# Patient Record
Sex: FEMALE | Race: WHITE | NOT HISPANIC OR LATINO | Employment: OTHER | URBAN - METROPOLITAN AREA
[De-identification: names, ages, dates, MRNs, and addresses within clinical notes are randomized per-mention and may not be internally consistent; named-entity substitution may affect disease eponyms.]

---

## 2023-09-07 LAB
ALANINE AMINOTRANSFERASE (SGPT) (U/L) IN SER/PLAS: 11 U/L (ref 7–45)
ALBUMIN (G/DL) IN SER/PLAS: 4.1 G/DL (ref 3.4–5)
ALKALINE PHOSPHATASE (U/L) IN SER/PLAS: 64 U/L (ref 33–136)
ANION GAP IN SER/PLAS: 13 MMOL/L (ref 10–20)
APPEARANCE, URINE: CLEAR
ASPARTATE AMINOTRANSFERASE (SGOT) (U/L) IN SER/PLAS: 20 U/L (ref 9–39)
BILIRUBIN TOTAL (MG/DL) IN SER/PLAS: 0.6 MG/DL (ref 0–1.2)
BILIRUBIN, URINE: NEGATIVE
BLOOD, URINE: ABNORMAL
CALCIDIOL (25 OH VITAMIN D3) (NG/ML) IN SER/PLAS: 87 NG/ML
CALCIUM (MG/DL) IN SER/PLAS: 9.5 MG/DL (ref 8.6–10.3)
CARBON DIOXIDE, TOTAL (MMOL/L) IN SER/PLAS: 28 MMOL/L (ref 21–32)
CHLORIDE (MMOL/L) IN SER/PLAS: 104 MMOL/L (ref 98–107)
CHOLESTEROL (MG/DL) IN SER/PLAS: 228 MG/DL (ref 0–199)
CHOLESTEROL IN HDL (MG/DL) IN SER/PLAS: 57 MG/DL
CHOLESTEROL/HDL RATIO: 4
COLOR, URINE: YELLOW
CREATININE (MG/DL) IN SER/PLAS: 1.5 MG/DL (ref 0.5–1.05)
ERYTHROCYTE DISTRIBUTION WIDTH (RATIO) BY AUTOMATED COUNT: 14.9 % (ref 11.5–14.5)
ERYTHROCYTE MEAN CORPUSCULAR HEMOGLOBIN CONCENTRATION (G/DL) BY AUTOMATED: 32.4 G/DL (ref 32–36)
ERYTHROCYTE MEAN CORPUSCULAR VOLUME (FL) BY AUTOMATED COUNT: 100 FL (ref 80–100)
ERYTHROCYTES (10*6/UL) IN BLOOD BY AUTOMATED COUNT: 4.18 X10E12/L (ref 4–5.2)
GFR FEMALE: 33 ML/MIN/1.73M2
GLUCOSE (MG/DL) IN SER/PLAS: 106 MG/DL (ref 74–99)
GLUCOSE, URINE: NEGATIVE MG/DL
HEMATOCRIT (%) IN BLOOD BY AUTOMATED COUNT: 41.7 % (ref 36–46)
HEMOGLOBIN (G/DL) IN BLOOD: 13.5 G/DL (ref 12–16)
KETONES, URINE: NEGATIVE MG/DL
LDL: 155 MG/DL (ref 0–99)
LEUKOCYTE ESTERASE, URINE: ABNORMAL
LEUKOCYTES (10*3/UL) IN BLOOD BY AUTOMATED COUNT: 7.2 X10E9/L (ref 4.4–11.3)
NITRITE, URINE: NEGATIVE
PH, URINE: 6 (ref 5–8)
PLATELETS (10*3/UL) IN BLOOD AUTOMATED COUNT: 218 X10E9/L (ref 150–450)
POTASSIUM (MMOL/L) IN SER/PLAS: 3.9 MMOL/L (ref 3.5–5.3)
PROTEIN TOTAL: 7.1 G/DL (ref 6.4–8.2)
PROTEIN, URINE: NEGATIVE MG/DL
RBC, URINE: 1 /HPF (ref 0–5)
SODIUM (MMOL/L) IN SER/PLAS: 141 MMOL/L (ref 136–145)
SPECIFIC GRAVITY, URINE: 1.01 (ref 1–1.03)
SQUAMOUS EPITHELIAL CELLS, URINE: <1 /HPF
THYROTROPIN (MIU/L) IN SER/PLAS BY DETECTION LIMIT <= 0.05 MIU/L: 4.3 MIU/L (ref 0.44–3.98)
TRIGLYCERIDE (MG/DL) IN SER/PLAS: 81 MG/DL (ref 0–149)
UREA NITROGEN (MG/DL) IN SER/PLAS: 25 MG/DL (ref 6–23)
UROBILINOGEN, URINE: <2 MG/DL (ref 0–1.9)
VLDL: 16 MG/DL (ref 0–40)
WBC, URINE: 2 /HPF (ref 0–5)

## 2024-01-05 PROBLEM — M81.0 OSTEOPOROSIS, SENILE: Status: ACTIVE | Noted: 2024-01-05

## 2024-01-05 PROBLEM — I42.8 NON-ISCHEMIC CARDIOMYOPATHY (MULTI): Status: ACTIVE | Noted: 2024-01-05

## 2024-01-05 PROBLEM — I10 WHITE COAT SYNDROME WITH HYPERTENSION: Status: ACTIVE | Noted: 2024-01-05

## 2024-01-05 PROBLEM — I44.7 LEFT BUNDLE BRANCH BLOCK: Status: ACTIVE | Noted: 2024-01-05

## 2024-01-05 PROBLEM — J38.3 SPASTIC DYSPHONIA: Status: ACTIVE | Noted: 2024-01-05

## 2024-01-05 PROBLEM — E78.2 MIXED DYSLIPIDEMIA: Status: ACTIVE | Noted: 2024-01-05

## 2024-01-05 PROBLEM — I07.1 TRICUSPID VALVE REGURGITATION: Status: ACTIVE | Noted: 2024-01-05

## 2024-01-05 PROBLEM — T78.3XXA ANGIOEDEMA: Status: ACTIVE | Noted: 2024-01-05

## 2024-01-05 PROBLEM — E55.9 VITAMIN D DEFICIENCY: Status: ACTIVE | Noted: 2024-01-05

## 2024-01-05 PROBLEM — I26.99 PULMONARY EMBOLISM (MULTI): Status: ACTIVE | Noted: 2024-01-05

## 2024-01-05 PROBLEM — I10 ESSENTIAL HYPERTENSION, BENIGN: Status: ACTIVE | Noted: 2024-01-05

## 2024-01-05 PROBLEM — E66.3 OVERWEIGHT: Status: ACTIVE | Noted: 2024-01-05

## 2024-01-05 PROBLEM — I24.0: Status: ACTIVE | Noted: 2024-01-05

## 2024-01-05 PROBLEM — I27.21 PULMONARY ARTERY HYPERTENSION (MULTI): Status: ACTIVE | Noted: 2024-01-05

## 2024-01-05 RX ORDER — CARVEDILOL 25 MG/1
1.5 TABLET ORAL
COMMUNITY
End: 2024-01-31 | Stop reason: SDUPTHER

## 2024-01-05 RX ORDER — ACETAMINOPHEN 500 MG
1 TABLET ORAL DAILY
COMMUNITY

## 2024-01-05 RX ORDER — FUROSEMIDE 20 MG/1
1 TABLET ORAL DAILY
COMMUNITY
End: 2024-02-13

## 2024-01-05 RX ORDER — MULTIVITAMIN WITH MINERALS
1 TABLET ORAL DAILY
COMMUNITY

## 2024-01-05 RX ORDER — ASPIRIN 81 MG/1
1 TABLET ORAL DAILY
COMMUNITY

## 2024-01-31 DIAGNOSIS — I10 ESSENTIAL HYPERTENSION, BENIGN: Primary | ICD-10-CM

## 2024-01-31 RX ORDER — CARVEDILOL 25 MG/1
37.5 TABLET ORAL
Qty: 180 TABLET | Refills: 1 | Status: SHIPPED | OUTPATIENT
Start: 2024-01-31

## 2024-02-13 DIAGNOSIS — I10 ESSENTIAL (PRIMARY) HYPERTENSION: ICD-10-CM

## 2024-02-13 RX ORDER — FUROSEMIDE 20 MG/1
20 TABLET ORAL DAILY
Qty: 90 TABLET | Refills: 3 | Status: SHIPPED | OUTPATIENT
Start: 2024-02-13

## 2024-03-18 ENCOUNTER — OFFICE VISIT (OUTPATIENT)
Dept: PRIMARY CARE | Facility: CLINIC | Age: 89
End: 2024-03-18
Payer: MEDICARE

## 2024-03-18 VITALS
DIASTOLIC BLOOD PRESSURE: 100 MMHG | TEMPERATURE: 97.9 F | HEART RATE: 80 BPM | HEIGHT: 60 IN | WEIGHT: 150 LBS | OXYGEN SATURATION: 96 % | SYSTOLIC BLOOD PRESSURE: 160 MMHG | BODY MASS INDEX: 29.45 KG/M2

## 2024-03-18 DIAGNOSIS — R39.9 URINARY TRACT INFECTION SYMPTOMS: ICD-10-CM

## 2024-03-18 DIAGNOSIS — I27.21 PULMONARY ARTERY HYPERTENSION (MULTI): ICD-10-CM

## 2024-03-18 DIAGNOSIS — E78.2 MIXED DYSLIPIDEMIA: ICD-10-CM

## 2024-03-18 DIAGNOSIS — I26.99 PULMONARY EMBOLISM, OTHER, UNSPECIFIED CHRONICITY, UNSPECIFIED WHETHER ACUTE COR PULMONALE PRESENT (MULTI): ICD-10-CM

## 2024-03-18 DIAGNOSIS — E55.9 VITAMIN D INSUFFICIENCY: ICD-10-CM

## 2024-03-18 DIAGNOSIS — I10 ESSENTIAL (PRIMARY) HYPERTENSION: Primary | ICD-10-CM

## 2024-03-18 DIAGNOSIS — I42.8 NON-ISCHEMIC CARDIOMYOPATHY (MULTI): ICD-10-CM

## 2024-03-18 DIAGNOSIS — Z00.00 ENCOUNTER FOR WELLNESS EXAMINATION: ICD-10-CM

## 2024-03-18 PROCEDURE — 1159F MED LIST DOCD IN RCRD: CPT | Performed by: INTERNAL MEDICINE

## 2024-03-18 PROCEDURE — 99214 OFFICE O/P EST MOD 30 MIN: CPT | Performed by: INTERNAL MEDICINE

## 2024-03-18 PROCEDURE — 1036F TOBACCO NON-USER: CPT | Performed by: INTERNAL MEDICINE

## 2024-03-18 PROCEDURE — 3077F SYST BP >= 140 MM HG: CPT | Performed by: INTERNAL MEDICINE

## 2024-03-18 PROCEDURE — 3080F DIAST BP >= 90 MM HG: CPT | Performed by: INTERNAL MEDICINE

## 2024-03-18 ASSESSMENT — PATIENT HEALTH QUESTIONNAIRE - PHQ9
SUM OF ALL RESPONSES TO PHQ9 QUESTIONS 1 AND 2: 0
2. FEELING DOWN, DEPRESSED OR HOPELESS: NOT AT ALL
1. LITTLE INTEREST OR PLEASURE IN DOING THINGS: NOT AT ALL

## 2024-03-18 ASSESSMENT — ENCOUNTER SYMPTOMS
JOINT SWELLING: 0
STRIDOR: 0
LOSS OF SENSATION IN FEET: 0
OCCASIONAL FEELINGS OF UNSTEADINESS: 1
HEMATURIA: 0
DEPRESSION: 0
BLOOD IN STOOL: 0
LIGHT-HEADEDNESS: 0
SPEECH DIFFICULTY: 0
ACTIVITY CHANGE: 0
PALPITATIONS: 0
EYE REDNESS: 0
FEVER: 0
CHEST TIGHTNESS: 0

## 2024-03-18 NOTE — PROGRESS NOTES
CHIEF COMPLAINT:    Mendy Diaz is a 89 y.o. female who presents for Follow-up (Patient here for follow-up appointment. ).    HISTORY OF PRESENT ILLNESS:  Mendy Diaz  is a pleasant 51-year-old female comes with her daughter here for her routine checkup.  Overall she is doing well.  She does have hypertension.  She is on Lasix.  She is complaining of muscular aches and pains.  She also tries to take care of potassium through her food.  Patient does have a strong whitecoat hypertension.  Her blood pressure here is high however her home blood pressure recording is within the normal limit.  This is something that I am aware of about her blood pressure and we did not change the blood pressure medications.  She gets very dizzy and lightheaded with the change of her blood pressure medications.  Patient does not have any acute medical complaint today.        Review of Systems   Constitutional:  Negative for activity change and fever.   HENT:  Negative for hearing loss, nosebleeds and tinnitus.    Eyes:  Negative for redness.   Respiratory:  Negative for chest tightness and stridor.    Cardiovascular:  Negative for chest pain, palpitations and leg swelling.   Gastrointestinal:  Negative for blood in stool.   Endocrine: Negative for cold intolerance.   Genitourinary:  Negative for hematuria.   Musculoskeletal:  Negative for joint swelling.   Skin:  Negative for rash.   Neurological:  Negative for speech difficulty and light-headedness.   Psychiatric/Behavioral:  Negative for behavioral problems.      Visit Vitals  BP (!) 160/100 (BP Location: Left arm, Patient Position: Sitting, BP Cuff Size: Adult)   Pulse 80   Temp 36.6 °C (97.9 °F) (Temporal)   Ht 1.524 m (5')   Wt 68 kg (150 lb)   SpO2 96%   BMI 29.29 kg/m²   Smoking Status Never   BSA 1.7 m²         Wt Readings from Last 10 Encounters:   03/18/24 68 kg (150 lb)   09/28/23 68 kg (150 lb)   07/25/23 68 kg (150 lb)   09/28/22 70.5 kg (155 lb 8 oz)   07/28/22 69.9 kg  (154 lb)       Physical Exam  Constitutional:       General: She is not in acute distress.     Appearance: Normal appearance.   HENT:      Head: Normocephalic.      Right Ear: Tympanic membrane normal.      Left Ear: Tympanic membrane normal.      Mouth/Throat:      Mouth: Mucous membranes are moist.   Cardiovascular:      Rate and Rhythm: Normal rate and regular rhythm.      Heart sounds: No murmur heard.  Pulmonary:      Effort: No respiratory distress.   Abdominal:      Palpations: Abdomen is soft.   Musculoskeletal:      Cervical back: Neck supple.      Right lower leg: No edema.      Left lower leg: No edema.   Skin:     Findings: No rash.   Neurological:      General: No focal deficit present.      Mental Status: She is alert and oriented to person, place, and time.   Psychiatric:         Mood and Affect: Mood normal.        RECENT LABS:  Lab Results   Component Value Date    WBC 7.2 09/07/2023    HGB 13.5 09/07/2023    HCT 41.7 09/07/2023     09/07/2023    CHOL 228 (H) 09/07/2023    TRIG 81 09/07/2023    HDL 57.0 09/07/2023    ALT 11 09/07/2023    AST 20 09/07/2023     09/07/2023    K 3.9 09/07/2023     09/07/2023    CREATININE 1.50 (H) 09/07/2023    BUN 25 (H) 09/07/2023    CO2 28 09/07/2023    TSH 4.30 (H) 09/07/2023     IMAGING:        Reviewed:   MEDICATIONS:   Current Outpatient Medications   Medication Instructions    aspirin 81 mg EC tablet 1 tablet, oral, Daily    carvedilol (COREG) 37.5 mg, oral, 2 times daily with meals    cholecalciferol (Vitamin D-3) 50 mcg (2,000 unit) capsule 1 capsule, oral, Daily    furosemide (LASIX) 20 mg, oral, Daily    multivitamin with minerals (Multiple Vitamin-Minerals) tablet 1 tablet, oral, Daily      TODAY'S VISIT  DX:   1. Essential (primary) hypertension  CBC and Auto Differential      2. Pulmonary embolism, other, unspecified chronicity, unspecified whether acute cor pulmonale present (CMS/HCC)        3. Pulmonary artery hypertension (CMS/HCC)         4. Non-ischemic cardiomyopathy (CMS/HCC)        5. Encounter for wellness examination  Comprehensive Metabolic Panel    Lipid Panel    TSH with reflex to Free T4 if abnormal    Urinalysis with Reflex Microscopic      6. Mixed dyslipidemia  Lipid Panel      7. Urinary tract infection symptoms  Urinalysis with Reflex Microscopic      8. Vitamin D insufficiency  Vitamin D 25-Hydroxy,Total (for eval of Vitamin D levels)         MEDICAL DECISION MAKING:  - Recent lab work and relevant imaging studies have been reviewed.    - The current active medical co morbidities have been considered.   - Relevant correspondence/notes from specialty consultants were reviewed and discussed with patient.    - Patient was given treatment as per above plan.   - Patient will continue with current medical therapy and plan.   - Medication have been sent for refill.    - Next Follow up in July 2024..

## 2024-07-01 ENCOUNTER — LAB (OUTPATIENT)
Dept: LAB | Facility: LAB | Age: 89
End: 2024-07-01
Payer: MEDICARE

## 2024-07-01 DIAGNOSIS — R39.9 URINARY TRACT INFECTION SYMPTOMS: ICD-10-CM

## 2024-07-01 DIAGNOSIS — E78.2 MIXED DYSLIPIDEMIA: ICD-10-CM

## 2024-07-01 DIAGNOSIS — I10 ESSENTIAL (PRIMARY) HYPERTENSION: ICD-10-CM

## 2024-07-01 DIAGNOSIS — E55.9 VITAMIN D INSUFFICIENCY: ICD-10-CM

## 2024-07-01 DIAGNOSIS — Z00.00 ENCOUNTER FOR WELLNESS EXAMINATION: ICD-10-CM

## 2024-07-01 LAB
25(OH)D3 SERPL-MCNC: 85 NG/ML (ref 30–100)
ALBUMIN SERPL BCP-MCNC: 4 G/DL (ref 3.4–5)
ALP SERPL-CCNC: 52 U/L (ref 33–136)
ALT SERPL W P-5'-P-CCNC: 8 U/L (ref 7–45)
ANION GAP SERPL CALC-SCNC: 14 MMOL/L (ref 10–20)
APPEARANCE UR: CLEAR
AST SERPL W P-5'-P-CCNC: 14 U/L (ref 9–39)
BASOPHILS # BLD AUTO: 0.06 X10*3/UL (ref 0–0.1)
BASOPHILS NFR BLD AUTO: 1.1 %
BILIRUB SERPL-MCNC: 0.6 MG/DL (ref 0–1.2)
BILIRUB UR STRIP.AUTO-MCNC: NEGATIVE MG/DL
BUN SERPL-MCNC: 29 MG/DL (ref 6–23)
CALCIUM SERPL-MCNC: 9.4 MG/DL (ref 8.6–10.3)
CHLORIDE SERPL-SCNC: 104 MMOL/L (ref 98–107)
CHOLEST SERPL-MCNC: 258 MG/DL (ref 0–199)
CHOLESTEROL/HDL RATIO: 5.2
CO2 SERPL-SCNC: 28 MMOL/L (ref 21–32)
COLOR UR: ABNORMAL
CREAT SERPL-MCNC: 1.36 MG/DL (ref 0.5–1.05)
EGFRCR SERPLBLD CKD-EPI 2021: 37 ML/MIN/1.73M*2
EOSINOPHIL # BLD AUTO: 0.27 X10*3/UL (ref 0–0.4)
EOSINOPHIL NFR BLD AUTO: 5.1 %
ERYTHROCYTE [DISTWIDTH] IN BLOOD BY AUTOMATED COUNT: 15.2 % (ref 11.5–14.5)
GLUCOSE SERPL-MCNC: 95 MG/DL (ref 74–99)
GLUCOSE UR STRIP.AUTO-MCNC: NORMAL MG/DL
HCT VFR BLD AUTO: 39.6 % (ref 36–46)
HDLC SERPL-MCNC: 49.3 MG/DL
HGB BLD-MCNC: 12.8 G/DL (ref 12–16)
HYALINE CASTS #/AREA URNS AUTO: ABNORMAL /LPF
IMM GRANULOCYTES # BLD AUTO: 0.04 X10*3/UL (ref 0–0.5)
IMM GRANULOCYTES NFR BLD AUTO: 0.8 % (ref 0–0.9)
KETONES UR STRIP.AUTO-MCNC: NEGATIVE MG/DL
LDLC SERPL CALC-MCNC: 193 MG/DL
LEUKOCYTE ESTERASE UR QL STRIP.AUTO: ABNORMAL
LYMPHOCYTES # BLD AUTO: 1.59 X10*3/UL (ref 0.8–3)
LYMPHOCYTES NFR BLD AUTO: 30.1 %
MCH RBC QN AUTO: 31.8 PG (ref 26–34)
MCHC RBC AUTO-ENTMCNC: 32.3 G/DL (ref 32–36)
MCV RBC AUTO: 98 FL (ref 80–100)
MONOCYTES # BLD AUTO: 0.56 X10*3/UL (ref 0.05–0.8)
MONOCYTES NFR BLD AUTO: 10.6 %
NEUTROPHILS # BLD AUTO: 2.76 X10*3/UL (ref 1.6–5.5)
NEUTROPHILS NFR BLD AUTO: 52.3 %
NITRITE UR QL STRIP.AUTO: NEGATIVE
NON HDL CHOLESTEROL: 209 MG/DL (ref 0–149)
NRBC BLD-RTO: 0 /100 WBCS (ref 0–0)
PH UR STRIP.AUTO: 5.5 [PH]
PLATELET # BLD AUTO: 192 X10*3/UL (ref 150–450)
POTASSIUM SERPL-SCNC: 4.7 MMOL/L (ref 3.5–5.3)
PROT SERPL-MCNC: 6.5 G/DL (ref 6.4–8.2)
PROT UR STRIP.AUTO-MCNC: NEGATIVE MG/DL
RBC # BLD AUTO: 4.03 X10*6/UL (ref 4–5.2)
RBC # UR STRIP.AUTO: NEGATIVE /UL
RBC #/AREA URNS AUTO: ABNORMAL /HPF
SODIUM SERPL-SCNC: 141 MMOL/L (ref 136–145)
SP GR UR STRIP.AUTO: 1.01
TRIGL SERPL-MCNC: 80 MG/DL (ref 0–149)
TSH SERPL-ACNC: 3.61 MIU/L (ref 0.44–3.98)
UROBILINOGEN UR STRIP.AUTO-MCNC: NORMAL MG/DL
VLDL: 16 MG/DL (ref 0–40)
WBC # BLD AUTO: 5.3 X10*3/UL (ref 4.4–11.3)
WBC #/AREA URNS AUTO: ABNORMAL /HPF
WBC CLUMPS #/AREA URNS AUTO: ABNORMAL /HPF

## 2024-07-01 PROCEDURE — 36415 COLL VENOUS BLD VENIPUNCTURE: CPT

## 2024-07-01 PROCEDURE — 80053 COMPREHEN METABOLIC PANEL: CPT

## 2024-07-01 PROCEDURE — 82306 VITAMIN D 25 HYDROXY: CPT

## 2024-07-01 PROCEDURE — 81001 URINALYSIS AUTO W/SCOPE: CPT

## 2024-07-01 PROCEDURE — 84443 ASSAY THYROID STIM HORMONE: CPT

## 2024-07-01 PROCEDURE — 85025 COMPLETE CBC W/AUTO DIFF WBC: CPT

## 2024-07-01 PROCEDURE — 80061 LIPID PANEL: CPT

## 2024-07-05 DIAGNOSIS — N30.00 ACUTE CYSTITIS WITHOUT HEMATURIA: Primary | ICD-10-CM

## 2024-07-05 RX ORDER — NITROFURANTOIN 25; 75 MG/1; MG/1
100 CAPSULE ORAL 2 TIMES DAILY
Qty: 14 CAPSULE | Refills: 0 | Status: SHIPPED | OUTPATIENT
Start: 2024-07-05 | End: 2024-07-12

## 2024-07-15 ENCOUNTER — APPOINTMENT (OUTPATIENT)
Dept: PRIMARY CARE | Facility: CLINIC | Age: 89
End: 2024-07-15
Payer: MEDICARE

## 2024-07-15 VITALS
BODY MASS INDEX: 28.51 KG/M2 | WEIGHT: 146 LBS | TEMPERATURE: 98.2 F | DIASTOLIC BLOOD PRESSURE: 88 MMHG | OXYGEN SATURATION: 95 % | HEART RATE: 74 BPM | SYSTOLIC BLOOD PRESSURE: 148 MMHG

## 2024-07-15 DIAGNOSIS — Z78.0 ENCOUNTER FOR OSTEOPOROSIS SCREENING IN ASYMPTOMATIC POSTMENOPAUSAL PATIENT: Primary | ICD-10-CM

## 2024-07-15 DIAGNOSIS — Z13.820 ENCOUNTER FOR OSTEOPOROSIS SCREENING IN ASYMPTOMATIC POSTMENOPAUSAL PATIENT: Primary | ICD-10-CM

## 2024-07-15 DIAGNOSIS — I10 ESSENTIAL HYPERTENSION, BENIGN: ICD-10-CM

## 2024-07-15 DIAGNOSIS — N18.31 STAGE 3A CHRONIC KIDNEY DISEASE (MULTI): ICD-10-CM

## 2024-07-15 PROCEDURE — 1124F ACP DISCUSS-NO DSCNMKR DOCD: CPT | Performed by: INTERNAL MEDICINE

## 2024-07-15 PROCEDURE — 1159F MED LIST DOCD IN RCRD: CPT | Performed by: INTERNAL MEDICINE

## 2024-07-15 PROCEDURE — 3077F SYST BP >= 140 MM HG: CPT | Performed by: INTERNAL MEDICINE

## 2024-07-15 PROCEDURE — 99214 OFFICE O/P EST MOD 30 MIN: CPT | Performed by: INTERNAL MEDICINE

## 2024-07-15 PROCEDURE — 3079F DIAST BP 80-89 MM HG: CPT | Performed by: INTERNAL MEDICINE

## 2024-07-15 ASSESSMENT — ENCOUNTER SYMPTOMS: DEPRESSION: 0

## 2024-07-15 ASSESSMENT — PATIENT HEALTH QUESTIONNAIRE - PHQ9
1. LITTLE INTEREST OR PLEASURE IN DOING THINGS: NOT AT ALL
2. FEELING DOWN, DEPRESSED OR HOPELESS: NOT AT ALL
SUM OF ALL RESPONSES TO PHQ9 QUESTIONS 1 AND 2: 0

## 2024-07-29 DIAGNOSIS — I10 ESSENTIAL HYPERTENSION, BENIGN: ICD-10-CM

## 2024-07-30 RX ORDER — CARVEDILOL 25 MG/1
37.5 TABLET ORAL
Qty: 180 TABLET | Refills: 1 | Status: SHIPPED | OUTPATIENT
Start: 2024-07-30

## 2024-07-31 PROBLEM — M81.0 OSTEOPOROSIS, SENILE: Status: RESOLVED | Noted: 2024-01-05 | Resolved: 2024-07-31

## 2024-07-31 ASSESSMENT — ENCOUNTER SYMPTOMS
JOINT SWELLING: 0
HEMATURIA: 0
EYE REDNESS: 0
LIGHT-HEADEDNESS: 0
STRIDOR: 0
SPEECH DIFFICULTY: 0
PALPITATIONS: 0
ACTIVITY CHANGE: 0
BLOOD IN STOOL: 0
CHEST TIGHTNESS: 0
FEVER: 0

## 2024-08-01 NOTE — PROGRESS NOTES
CHIEF COMPLAINT:    Chief Complaint   Patient presents with    Follow-up     4 month        HISTORY OF PRESENT ILLNESS:  Mendy Diaz  is a pleasant 89 y.o. female Came with her daughter.  This is her follow-up visit.  Overall she is doing well.  She does have hypertension.  Her office her blood pressure today again is high.  She definitely has whitecoat hypertension.  We had multiple times given her blood pressure log.  Her home blood pressures are always normal.  Patient has done her annual blood work.  Her cancer screening tests are up-to-date.  She will require a DEXA scan.  She has chronic kidney disease which is stable.  She sees Dr. Paul for her cardiac health.      Review of Systems   Constitutional:  Negative for activity change and fever.   HENT:  Negative for hearing loss, nosebleeds and tinnitus.    Eyes:  Negative for redness.   Respiratory:  Negative for chest tightness and stridor.    Cardiovascular:  Negative for chest pain, palpitations and leg swelling.   Gastrointestinal:  Negative for blood in stool.   Endocrine: Negative for cold intolerance.   Genitourinary:  Negative for hematuria.   Musculoskeletal:  Negative for joint swelling.   Skin:  Negative for rash.   Neurological:  Negative for speech difficulty and light-headedness.   Psychiatric/Behavioral:  Negative for behavioral problems.      Visit Vitals  /88 (BP Location: Left arm, Patient Position: Sitting, BP Cuff Size: Adult)   Pulse 74   Temp 36.8 °C (98.2 °F) (Tympanic)   Wt 66.2 kg (146 lb)   SpO2 95%   BMI 28.51 kg/m²   Smoking Status Never   BSA 1.67 m²         Wt Readings from Last 10 Encounters:   07/15/24 66.2 kg (146 lb)   03/18/24 68 kg (150 lb)   09/28/23 68 kg (150 lb)   07/25/23 68 kg (150 lb)   09/28/22 70.5 kg (155 lb 8 oz)   07/28/22 69.9 kg (154 lb)       Physical Exam  Constitutional:       General: She is not in acute distress.     Appearance: Normal appearance.   HENT:      Head: Normocephalic.      Right Ear:  Tympanic membrane normal.      Left Ear: Tympanic membrane normal.      Mouth/Throat:      Mouth: Mucous membranes are moist.   Cardiovascular:      Rate and Rhythm: Normal rate and regular rhythm.      Heart sounds: No murmur heard.  Pulmonary:      Effort: No respiratory distress.   Abdominal:      Palpations: Abdomen is soft.   Musculoskeletal:      Cervical back: Neck supple.      Right lower leg: No edema.      Left lower leg: No edema.   Skin:     Findings: No rash.   Neurological:      General: No focal deficit present.      Mental Status: She is alert and oriented to person, place, and time.   Psychiatric:         Mood and Affect: Mood normal.        RECENT LABS:  Lab Results   Component Value Date    WBC 5.3 07/01/2024    HGB 12.8 07/01/2024    HCT 39.6 07/01/2024     07/01/2024    CHOL 258 (H) 07/01/2024    TRIG 80 07/01/2024    HDL 49.3 07/01/2024    ALT 8 07/01/2024    AST 14 07/01/2024     07/01/2024    K 4.7 07/01/2024     07/01/2024    CREATININE 1.36 (H) 07/01/2024    BUN 29 (H) 07/01/2024    CO2 28 07/01/2024    TSH 3.61 07/01/2024     Lab Results   Component Value Date    GLUCOSE 95 07/01/2024    CALCIUM 9.4 07/01/2024     07/01/2024    K 4.7 07/01/2024    CO2 28 07/01/2024     07/01/2024    BUN 29 (H) 07/01/2024    CREATININE 1.36 (H) 07/01/2024        MEDICATIONS:   Current Outpatient Medications   Medication Instructions    aspirin 81 mg EC tablet 1 tablet, oral, Daily    carvedilol (COREG) 37.5 mg, oral, 2 times daily (morning and late afternoon)    cholecalciferol (Vitamin D-3) 50 mcg (2,000 unit) capsule 1 capsule, oral, Daily    furosemide (LASIX) 20 mg, oral, Daily    multivitamin with minerals (Multiple Vitamin-Minerals) tablet 1 tablet, oral, Daily        TODAY'S VISIT  DX:   1. Encounter for osteoporosis screening in asymptomatic postmenopausal patient  XR DEXA bone density      2. Stage 3a chronic kidney disease (Multi)  Stable.  Avoid OTC NSAIDs      3.  Essential hypertension, benign  Continue Coreg and Lasix           MEDICAL DECISION MAKING:  - The current and active medical co morbidities have been considered.   - Recent lab work and relevant imaging studies have been reviewed.    - Relevant correspondence/notes from other specialty consultants were reviewed.    - Next Follow up in 6-month  - Patient was given treatment as per above plan

## 2024-08-05 ENCOUNTER — HOSPITAL ENCOUNTER (OUTPATIENT)
Dept: RADIOLOGY | Facility: HOSPITAL | Age: 89
Discharge: HOME | End: 2024-08-05
Payer: MEDICARE

## 2024-08-05 DIAGNOSIS — Z78.0 ENCOUNTER FOR OSTEOPOROSIS SCREENING IN ASYMPTOMATIC POSTMENOPAUSAL PATIENT: ICD-10-CM

## 2024-08-05 DIAGNOSIS — Z13.820 ENCOUNTER FOR OSTEOPOROSIS SCREENING IN ASYMPTOMATIC POSTMENOPAUSAL PATIENT: ICD-10-CM

## 2024-08-05 PROCEDURE — 77080 DXA BONE DENSITY AXIAL: CPT

## 2024-08-05 PROCEDURE — 77080 DXA BONE DENSITY AXIAL: CPT | Performed by: RADIOLOGY

## 2024-09-30 ENCOUNTER — APPOINTMENT (OUTPATIENT)
Dept: CARDIOLOGY | Facility: CLINIC | Age: 89
End: 2024-09-30
Payer: MEDICARE

## 2024-09-30 VITALS
SYSTOLIC BLOOD PRESSURE: 140 MMHG | BODY MASS INDEX: 32.73 KG/M2 | DIASTOLIC BLOOD PRESSURE: 92 MMHG | WEIGHT: 145.5 LBS | HEART RATE: 72 BPM | HEIGHT: 56 IN

## 2024-09-30 DIAGNOSIS — E78.2 MIXED DYSLIPIDEMIA: ICD-10-CM

## 2024-09-30 DIAGNOSIS — I27.21 PULMONARY ARTERY HYPERTENSION (MULTI): ICD-10-CM

## 2024-09-30 DIAGNOSIS — T78.3XXS ANGIOEDEMA, SEQUELA: ICD-10-CM

## 2024-09-30 DIAGNOSIS — I10 ESSENTIAL HYPERTENSION, BENIGN: ICD-10-CM

## 2024-09-30 DIAGNOSIS — Z78.9 NEVER SMOKED TOBACCO: ICD-10-CM

## 2024-09-30 DIAGNOSIS — I42.8 NON-ISCHEMIC CARDIOMYOPATHY (MULTI): ICD-10-CM

## 2024-09-30 DIAGNOSIS — I10 WHITE COAT SYNDROME WITH HYPERTENSION: ICD-10-CM

## 2024-09-30 PROCEDURE — 1036F TOBACCO NON-USER: CPT | Performed by: INTERNAL MEDICINE

## 2024-09-30 PROCEDURE — 3077F SYST BP >= 140 MM HG: CPT | Performed by: INTERNAL MEDICINE

## 2024-09-30 PROCEDURE — 1159F MED LIST DOCD IN RCRD: CPT | Performed by: INTERNAL MEDICINE

## 2024-09-30 PROCEDURE — 99214 OFFICE O/P EST MOD 30 MIN: CPT | Performed by: INTERNAL MEDICINE

## 2024-09-30 PROCEDURE — 3080F DIAST BP >= 90 MM HG: CPT | Performed by: INTERNAL MEDICINE

## 2024-09-30 RX ORDER — BRIMONIDINE TARTRATE AND TIMOLOL MALEATE 2; 5 MG/ML; MG/ML
1 SOLUTION OPHTHALMIC EVERY 12 HOURS SCHEDULED
COMMUNITY
Start: 2024-07-17

## 2024-09-30 NOTE — PROGRESS NOTES
CARDIOLOGY OFFICE VISIT      CHIEF COMPLAINT  Chief Complaint   Patient presents with    Follow-up     1 year follow-up        HISTORY OF PRESENT ILLNESS   The patient is an 89 -year-old  female with past medical history significant for white coat syndrome, hypertension, hypercholesterolemia, left bundle-branch block, nonischemic cardiomyopathy, and pulmonary embolus, who presents for followup cardiovascular care.       The patient denies chest pain, dyspnea, palpitations, nausea, vomiting, lightheadedness,  dizziness, near syncope, or milton syncope. She has chronic edema, which she believes edema has not changed. She has not been wearing compression stockings. Blood pressure readings are normal.        PAST MEDICAL HISTORY: As above, plus: Chronic vocal hoarseness.      PAST SURGICAL HISTORY: None.      SOCIAL HISTORY: Lifetime nonsmoker. Denies alcohol use.      FAMILY HISTORY: Negative for premature coronary artery disease.      ASSESSMENT:   Nonischemic cardiomyopathy with improved systolic function  Hypertension.  White coat syndrome  Left bundle-branch block.  Hypercholesterolemia - declines medical therapy.  Bilateral pulmonary embolus, November 22, 2012.  Tricuspid valve regurgitation  Pulmonary artery hypertension  Renal insufficiency.  Anxiety.  ACE inhibitor allergy - angioedema.  Questionable intolerance to amlodipine due to allergy.  Questionable intolerance/allergy due to losartan.  Previous intolerance to hydralazine due to dizziness, fatigue, and nausea.  Intolerance to atorvastatin due to near syncope.  Refuses pneumonia vaccination.  History of medical noncompliance with taking medications.     RECOMMENDATIONS:   Home blood pressure readings are normal. Patient appears to be stable from a cardiac standpoint. No symptoms of angina. No evidence of decompensated heart failure. Most recent echo reviewed reveals normal left ventricular systolic function. No symptomatically arrhythmia. Blood  pressure readings are recently control for her age. Advise wearing compression stockings. Continue blood pressure diary prior to office visit. Bring in blood pressure machine to each office visit. Follow-up in 1 year.  Routine lab work through PCP.     Please excuse any errors in grammar or translation related to this dictation. Voice recognition software was utilized to prepare this document.     Recent Cardiovascular Testing:  The following results have been reviewed and updated.     Labs: 7/1/24  1. TC: 258,HDL 49, , Trig 80     ECHO: 9/8/2020  1. EF 60-65%.  2. Mild to moderate TR.  3. Moderately elevated pulmonary artery pressure.     CRD: 3/18/13  1. Mild carotid artery disease.     LEXISCAN 4/17/2013  1. NORMAL  EF 56%        VITALS  Vitals:    09/30/24 1138   BP: (!) 140/92   Pulse: 72     Wt Readings from Last 4 Encounters:   09/30/24 66 kg (145 lb 8 oz)   07/15/24 66.2 kg (146 lb)   03/18/24 68 kg (150 lb)   09/28/23 68 kg (150 lb)       PHYSICAL EXAM:  GENERAL:  Well developed, well nourished, in no acute distress.  CHEST:  Symmetric and nontender.  NEURO/PSYCH:  Alert and oriented times three with approppriate behavior and responses.  NECK:  Supple, no JVD, no bruit.  LUNGS:  Clear to auscultation bilaterally, normal respiratory effort.  HEART:  Rate and rhythm regular with no evident murmur, no gallop appreciated.                   There are no rubs, clicks or heaves.  EXTREMITIES:  Warm with good color, no clubbing or cyanosis.  There is 1-2+ bilateral lower extremity  edema  left > right.  PERIPHERAL VASCULAR:  Pulses present and equally palpable; 2+ throughout.    ASSESSMENT AND PLAN  Problem List Items Addressed This Visit          Allergies and Adverse Reactions    Angioedema       Cardiac and Vasculature    Essential hypertension, benign    Mixed dyslipidemia    Non-ischemic cardiomyopathy (Multi)    Pulmonary artery hypertension (Multi)    White coat syndrome with hypertension        "Endocrine/Metabolic    BMI 32.0-32.9,adult       Tobacco    Never smoked tobacco       Past Medical History  History reviewed. No pertinent past medical history.    Social History  Social History     Tobacco Use    Smoking status: Never    Smokeless tobacco: Never   Vaping Use    Vaping status: Never Used   Substance Use Topics    Alcohol use: Not Currently    Drug use: Not Currently       Family History     Family History   Problem Relation Name Age of Onset    Coronary artery disease Mother      Diabetes Daughter          Allergies:  Allergies   Allergen Reactions    Amlodipine Unknown    Iodine Unknown    Lisinopril Unknown    Loratadine Unknown    Losartan Unknown    Prednisolone Unknown    Spironolactone Unknown    Sulfamethazine Unknown        Outpatient Medications:  Current Outpatient Medications   Medication Instructions    aspirin 81 mg EC tablet 1 tablet, oral, Daily    brimonidine-timoloL (Combigan) 0.2-0.5 % ophthalmic solution 1 drop, Both Eyes, Every 12 hours scheduled    carvedilol (COREG) 37.5 mg, oral, 2 times daily (morning and late afternoon)    cholecalciferol (Vitamin D-3) 50 mcg (2,000 unit) capsule 1 capsule, oral, Daily    furosemide (LASIX) 20 mg, oral, Daily        Recent Lab Results:    CMP:    Lab Results   Component Value Date     07/01/2024    K 4.7 07/01/2024     07/01/2024    CO2 28 07/01/2024    BUN 29 (H) 07/01/2024    CREATININE 1.36 (H) 07/01/2024    GLUCOSE 95 07/01/2024    CALCIUM 9.4 07/01/2024       Magnesium:    No results found for: \"MG\"    Lipid Profile:    Lab Results   Component Value Date    TRIG 80 07/01/2024    HDL 49.3 07/01/2024    LDLCALC 193 (H) 07/01/2024       TSH:    Lab Results   Component Value Date    TSH 3.61 07/01/2024       BNP:   No results found for: \"BNP\"     PT/INR:    No results found for: \"PROTIME\", \"INR\"    HgBA1c:    No results found for: \"HGBA1C\"    BMP:  Lab Results   Component Value Date     07/01/2024     09/07/2023    " " 09/07/2022     07/27/2021    K 4.7 07/01/2024    K 3.9 09/07/2023    K 4.1 09/07/2022    K 4.1 07/27/2021     07/01/2024     09/07/2023     09/07/2022     07/27/2021    CO2 28 07/01/2024    CO2 28 09/07/2023    CO2 29 09/07/2022    CO2 29 07/27/2021    BUN 29 (H) 07/01/2024    BUN 25 (H) 09/07/2023    BUN 25 (H) 09/07/2022    BUN 36 (H) 07/27/2021    CREATININE 1.36 (H) 07/01/2024    CREATININE 1.50 (H) 09/07/2023    CREATININE 1.42 (H) 09/07/2022    CREATININE 1.46 (H) 07/27/2021       CBC:  Lab Results   Component Value Date    WBC 5.3 07/01/2024    WBC 7.2 09/07/2023    WBC 6.7 09/07/2022    WBC 7.0 07/27/2021    RBC 4.03 07/01/2024    RBC 4.18 09/07/2023    RBC 4.28 09/07/2022    RBC 4.33 07/27/2021    HGB 12.8 07/01/2024    HGB 13.5 09/07/2023    HGB 13.9 09/07/2022    HGB 13.8 07/27/2021    HCT 39.6 07/01/2024    HCT 41.7 09/07/2023    HCT 41.5 09/07/2022    HCT 42.2 07/27/2021    MCV 98 07/01/2024     09/07/2023    MCV 97 09/07/2022    MCV 97 07/27/2021    MCH 31.8 07/01/2024    MCHC 32.3 07/01/2024    MCHC 32.4 09/07/2023    MCHC 33.5 09/07/2022    MCHC 32.7 07/27/2021    RDW 15.2 (H) 07/01/2024    RDW 14.9 (H) 09/07/2023    RDW 14.6 (H) 09/07/2022    RDW 14.6 (H) 07/27/2021     07/01/2024     09/07/2023     09/07/2022     07/27/2021       Cardiac Enzymes:    No results found for: \"TROPHS\"    Hepatic Function Panel:    Lab Results   Component Value Date    ALKPHOS 52 07/01/2024    ALT 8 07/01/2024    AST 14 07/01/2024    PROT 6.5 07/01/2024    BILITOT 0.6 07/01/2024           Markell Paul, DO        "

## 2024-09-30 NOTE — PATIENT INSTRUCTIONS
1 year visit  Continue same medications and treatments.   Patient educated on proper medication use.   Patient educated on risk factor modification.   Please bring any lab results from other providers / physicians to your next appointment.     Please bring all medicines, vitamins, and herbal supplements with you when you come to the office.     Prescriptions will not be filled unless you are compliant with your follow up appointments or have a follow up appointment scheduled as per instruction of your physician. Refills should be requested at the time of your visit.

## 2024-11-19 DIAGNOSIS — I10 ESSENTIAL HYPERTENSION, BENIGN: ICD-10-CM

## 2024-11-19 RX ORDER — CARVEDILOL 25 MG/1
37.5 TABLET ORAL
Qty: 180 TABLET | Refills: 1 | Status: SHIPPED | OUTPATIENT
Start: 2024-11-19

## 2025-01-27 DIAGNOSIS — I10 ESSENTIAL HYPERTENSION, BENIGN: ICD-10-CM

## 2025-01-29 RX ORDER — CARVEDILOL 25 MG/1
37.5 TABLET ORAL
Qty: 180 TABLET | Refills: 3 | Status: SHIPPED | OUTPATIENT
Start: 2025-01-29

## 2025-03-04 DIAGNOSIS — I10 ESSENTIAL (PRIMARY) HYPERTENSION: ICD-10-CM

## 2025-03-04 NOTE — TELEPHONE ENCOUNTER
Received request for prescription refill for patient.  Patient follows with Dr. Markell Paul, DO     Request is for Lasix   Is patient currently on medication- yes    Last OV- 9/30/24  Next OV- 9/29/25    Pended for signing and sent to provider.

## 2025-03-05 RX ORDER — FUROSEMIDE 20 MG/1
20 TABLET ORAL DAILY
Qty: 90 TABLET | Refills: 3 | Status: SHIPPED | OUTPATIENT
Start: 2025-03-05 | End: 2026-03-05

## 2025-07-21 ENCOUNTER — APPOINTMENT (OUTPATIENT)
Dept: PRIMARY CARE | Facility: CLINIC | Age: OVER 89
End: 2025-07-21
Payer: MEDICARE

## 2025-07-21 DIAGNOSIS — Z00.00 ENCOUNTER FOR SUBSEQUENT ANNUAL WELLNESS VISIT (AWV) IN MEDICARE PATIENT: ICD-10-CM

## 2025-07-21 DIAGNOSIS — Z00.00 ANNUAL WELLNESS VISIT: Primary | ICD-10-CM

## 2025-07-21 DIAGNOSIS — I27.21 PULMONARY ARTERY HYPERTENSION (MULTI): ICD-10-CM

## 2025-07-21 DIAGNOSIS — N18.31 STAGE 3A CHRONIC KIDNEY DISEASE (MULTI): ICD-10-CM

## 2025-07-21 PROCEDURE — 3079F DIAST BP 80-89 MM HG: CPT | Performed by: INTERNAL MEDICINE

## 2025-07-21 PROCEDURE — 1170F FXNL STATUS ASSESSED: CPT | Performed by: INTERNAL MEDICINE

## 2025-07-21 PROCEDURE — 1159F MED LIST DOCD IN RCRD: CPT | Performed by: INTERNAL MEDICINE

## 2025-07-21 PROCEDURE — G0444 DEPRESSION SCREEN ANNUAL: HCPCS | Performed by: INTERNAL MEDICINE

## 2025-07-21 PROCEDURE — 99497 ADVNCD CARE PLAN 30 MIN: CPT | Performed by: INTERNAL MEDICINE

## 2025-07-21 PROCEDURE — G0136 PR ADMINISTRATION OF A STANDARDIZED, EVIDENCE-BASED SOCIAL DETERMINANTS OF HEALTH RISK ASSESSMENT TOOL, 5 TO 15 MINUTES: HCPCS | Performed by: INTERNAL MEDICINE

## 2025-07-21 PROCEDURE — 3075F SYST BP GE 130 - 139MM HG: CPT | Performed by: INTERNAL MEDICINE

## 2025-07-21 PROCEDURE — G0439 PPPS, SUBSEQ VISIT: HCPCS | Performed by: INTERNAL MEDICINE

## 2025-07-21 PROCEDURE — 1124F ACP DISCUSS-NO DSCNMKR DOCD: CPT | Performed by: INTERNAL MEDICINE

## 2025-07-21 PROCEDURE — 99214 OFFICE O/P EST MOD 30 MIN: CPT | Performed by: INTERNAL MEDICINE

## 2025-07-21 PROCEDURE — 99397 PER PM REEVAL EST PAT 65+ YR: CPT | Performed by: INTERNAL MEDICINE

## 2025-07-21 ASSESSMENT — PATIENT HEALTH QUESTIONNAIRE - PHQ9
SUM OF ALL RESPONSES TO PHQ9 QUESTIONS 1 AND 2: 0
1. LITTLE INTEREST OR PLEASURE IN DOING THINGS: NOT AT ALL
2. FEELING DOWN, DEPRESSED OR HOPELESS: NOT AT ALL

## 2025-07-21 ASSESSMENT — ACTIVITIES OF DAILY LIVING (ADL)
TAKING_MEDICATION: INDEPENDENT
BATHING: INDEPENDENT
DOING_HOUSEWORK: INDEPENDENT
GROCERY_SHOPPING: INDEPENDENT
MANAGING_FINANCES: INDEPENDENT
DRESSING: INDEPENDENT

## 2025-07-21 NOTE — PROGRESS NOTES
CHIEF COMPLAINT:   Annual Wellness Checkup       HISTORY OF PRESENT ILLNESS:   Mendy Diaz comes for annual medical check up.  No acute medical complaint today. Overall doing well. . Chronic medical conditions are stable with current medical management. Cognitive function is assessed. Immunizations were discussed in length, patient has never been immunized w/ pneumococcal vaccine, covid, flue, rsv, and shingrex vaccines due to personal reasons. Discussed benefit of vaccinations and patient respectfully deferred. Home medications have been reconciled. The healthy lifestyle has been reinforced. Encouraged continued avoidance of tobacco, alcohol, poly pharmacy and substances. Functional capacity has been assessed. Discussed about fall risk and safety measures, at home and outside.  Age appropriate cancer screening tests were discussed and patient no longer has indications for active screening. Reminded about code status and health care Power of  (POA). Discussed about mental health and wellbeing. The depression screening questionnaire  (PHQ 9) was discussed for 5 mins. Vital signs, recent lab results, imaging studies were reviewed. At the end patient raised no other medical concerns. A complete physical exams was performed to evaluate those conditions.    On this visit:   Discussion about advance directives: 16 mins.   Depression screening 5 mins  5 minutes spent on SDOH screening:   Specifically Housing, Food Insecurity, Utilities and Transportatin Needs were evaluated and no issues were identified.        Review of Systems   Constitutional:  No activity change or fever   HENT:  Denies ringing ears or nose bleed   Respiratory:  Denies stridor. No blood in sputum   Cardiovascular:  Denies chest pain, no sudden excessive sweating   Gastrointestinal:  No sour burping, no blood in stool    Genitourinary:  Denies blood in urine    Musculoskeletal:  No joint redness or swelling    Skin:  No new spot changing  "color or shape or border    Neurological:  No speech difficulty, facial droop    Psychiatric/Behavioral:  No agitation, denies Hallucination       Visit Vitals  BP (!) 140/100 (BP Location: Left arm, Patient Position: Sitting, BP Cuff Size: Adult)   Pulse 76   Temp 36.7 °C (98 °F) (Temporal)   Ht (!) 1.422 m (4' 8\")   Wt 68.1 kg (150 lb 3.2 oz)   SpO2 96% Comment: RA   BMI 33.67 kg/m²   Smoking Status Never   BSA 1.64 m²           Wt Readings from Last 10 Encounters:   07/21/25 68.1 kg (150 lb 3.2 oz)   09/30/24 66 kg (145 lb 8 oz)   07/15/24 66.2 kg (146 lb)   03/18/24 68 kg (150 lb)   09/28/23 68 kg (150 lb)   07/25/23 68 kg (150 lb)   09/28/22 70.5 kg (155 lb 8 oz)   07/28/22 69.9 kg (154 lb)        PHYSICAL EXAM:  Gen: Alert, awake, Oriented X 3. Not in any acute distress   HEENT:  Atraumatic, PERRL.  Conjunctivae clear.   Moist nasal mucous membranes. oropharynx non erythematous,   Neck:  Supple without thyromegaly or lymphadenopathy.  Lungs:  Clear to auscultation without rales, rhonchi, or rub.  Heart:  RRR, S1, S2, without M.  Abdomen:  Soft, non tender, no organ enlargement, bruit. Bowel sounds present . No CVA tenderness.  Extremities:  LLE 1+ pitting edema. No calf swelling or tenderness.    Skin:  No rash, ecchymosis or erythema.    RECENT LABS:  Lab Results   Component Value Date    WBC 5.3 07/01/2024    HGB 12.8 07/01/2024    HCT 39.6 07/01/2024     07/01/2024    CHOL 258 (H) 07/01/2024    TRIG 80 07/01/2024    HDL 49.3 07/01/2024    ALT 8 07/01/2024    AST 14 07/01/2024     07/01/2024    K 4.7 07/01/2024     07/01/2024    CREATININE 1.36 (H) 07/01/2024    BUN 29 (H) 07/01/2024    CO2 28 07/01/2024    TSH 3.61 07/01/2024     Lab Results   Component Value Date    GLUCOSE 95 07/01/2024    CALCIUM 9.4 07/01/2024     07/01/2024    K 4.7 07/01/2024    CO2 28 07/01/2024     07/01/2024    BUN 29 (H) 07/01/2024    CREATININE 1.36 (H) 07/01/2024      Lab Results   Component Value " "Date    LDLCALC 193 (H) 07/01/2024     No results found for: \"HGBA1C\"  Lab Results   Component Value Date    LDLCALC 193 (H) 07/01/2024    CREATININE 1.36 (H) 07/01/2024       IMAGING:  Imaging  No results found.    Cardiology, Vascular, and Other Imaging  No other imaging results found for the past 7 days       MEDICATIONS:   Current Outpatient Medications   Medication Instructions    aspirin 81 mg EC tablet 1 tablet, Daily    brimonidine-timoloL (Combigan) 0.2-0.5 % ophthalmic solution 1 drop, Both Eyes, Every 12 hours scheduled    carvedilol (COREG) 37.5 mg, oral, 2 times daily (morning and late afternoon)    cholecalciferol (Vitamin D-3) 50 mcg (2,000 unit) capsule 1 capsule, Daily    furosemide (LASIX) 20 mg, oral, Daily        TODAY'S VISIT  DX:     1. Annual wellness visit  Will send annual blood work. No further cancer screening. Immunizations discussed but patient deferred.       2. Pulmonary artery hypertension (Multi)  Stable, no changes      3. Stage 3a chronic kidney disease (Multi)  Stable, will monitor Cr with CMP             MEDICAL DECISION MAKING:   - Relevant correspondence/notes from specialty consultants were reviewed.  - Active co morbidities conditions are stable for now.    - Cognitive function stable.    - Immunizations were discussed with patient and recommended.   - Preventative cancer screening tests are no longer indicated.    - Medications have been sent for refill  - F/U in 1 year or as needed   "

## 2025-07-24 VITALS
SYSTOLIC BLOOD PRESSURE: 130 MMHG | WEIGHT: 150.2 LBS | HEIGHT: 56 IN | BODY MASS INDEX: 33.79 KG/M2 | DIASTOLIC BLOOD PRESSURE: 80 MMHG | OXYGEN SATURATION: 96 % | HEART RATE: 76 BPM | TEMPERATURE: 98 F

## 2025-07-26 LAB
ALBUMIN SERPL-MCNC: 4 G/DL (ref 3.6–5.1)
ALP SERPL-CCNC: 67 U/L (ref 37–153)
ALT SERPL-CCNC: 7 U/L (ref 6–29)
ANION GAP SERPL CALCULATED.4IONS-SCNC: 9 MMOL/L (CALC) (ref 7–17)
AST SERPL-CCNC: 19 U/L (ref 10–35)
BILIRUB SERPL-MCNC: 0.5 MG/DL (ref 0.2–1.2)
BUN SERPL-MCNC: 28 MG/DL (ref 7–25)
CALCIUM SERPL-MCNC: 9 MG/DL (ref 8.6–10.4)
CHLORIDE SERPL-SCNC: 104 MMOL/L (ref 98–110)
CHOLEST SERPL-MCNC: 244 MG/DL
CHOLEST/HDLC SERPL: 4.1 (CALC)
CO2 SERPL-SCNC: 28 MMOL/L (ref 20–32)
CREAT SERPL-MCNC: 1.51 MG/DL (ref 0.6–0.95)
EGFRCR SERPLBLD CKD-EPI 2021: 32 ML/MIN/1.73M2
ERYTHROCYTE [DISTWIDTH] IN BLOOD BY AUTOMATED COUNT: 15 % (ref 11–15)
EST. AVERAGE GLUCOSE BLD GHB EST-MCNC: 108 MG/DL
EST. AVERAGE GLUCOSE BLD GHB EST-SCNC: 6 MMOL/L
GLUCOSE SERPL-MCNC: 98 MG/DL (ref 65–99)
HBA1C MFR BLD: 5.4 %
HCT VFR BLD AUTO: 40.6 % (ref 35–45)
HDLC SERPL-MCNC: 59 MG/DL
HGB BLD-MCNC: 12.9 G/DL (ref 11.7–15.5)
LDLC SERPL CALC-MCNC: 169 MG/DL (CALC)
MCH RBC QN AUTO: 31.9 PG (ref 27–33)
MCHC RBC AUTO-ENTMCNC: 31.8 G/DL (ref 32–36)
MCV RBC AUTO: 100.5 FL (ref 80–100)
NONHDLC SERPL-MCNC: 185 MG/DL (CALC)
PLATELET # BLD AUTO: 209 THOUSAND/UL (ref 140–400)
PMV BLD REES-ECKER: 9.9 FL (ref 7.5–12.5)
POTASSIUM SERPL-SCNC: 4.4 MMOL/L (ref 3.5–5.3)
PROT SERPL-MCNC: 6.5 G/DL (ref 6.1–8.1)
RBC # BLD AUTO: 4.04 MILLION/UL (ref 3.8–5.1)
SODIUM SERPL-SCNC: 141 MMOL/L (ref 135–146)
TRIGL SERPL-MCNC: 67 MG/DL
WBC # BLD AUTO: 5.8 THOUSAND/UL (ref 3.8–10.8)

## 2025-09-29 ENCOUNTER — APPOINTMENT (OUTPATIENT)
Dept: CARDIOLOGY | Facility: CLINIC | Age: OVER 89
End: 2025-09-29
Payer: MEDICARE